# Patient Record
Sex: FEMALE | Race: ASIAN | NOT HISPANIC OR LATINO | Employment: FULL TIME | URBAN - METROPOLITAN AREA
[De-identification: names, ages, dates, MRNs, and addresses within clinical notes are randomized per-mention and may not be internally consistent; named-entity substitution may affect disease eponyms.]

---

## 2021-10-26 ENCOUNTER — APPOINTMENT (OUTPATIENT)
Dept: OPTOMETRY | Facility: CLINIC | Age: 51
End: 2021-10-26
Payer: COMMERCIAL

## 2021-10-26 ENCOUNTER — OFFICE VISIT (OUTPATIENT)
Dept: OPTOMETRY | Facility: CLINIC | Age: 51
End: 2021-10-26
Payer: COMMERCIAL

## 2021-10-26 DIAGNOSIS — Z01.01 ENCOUNTER FOR EXAMINATION OF EYES AND VISION WITH ABNORMAL FINDINGS: Primary | ICD-10-CM

## 2021-10-26 DIAGNOSIS — H52.222 REGULAR ASTIGMATISM OF LEFT EYE: ICD-10-CM

## 2021-10-26 DIAGNOSIS — H52.4 PRESBYOPIA: ICD-10-CM

## 2021-10-26 DIAGNOSIS — H11.002 PTERYGIUM OF EYE, LEFT: ICD-10-CM

## 2021-10-26 PROBLEM — R73.03 PREDIABETES: Status: ACTIVE | Noted: 2021-03-09

## 2021-10-26 PROBLEM — K56.609 SBO (SMALL BOWEL OBSTRUCTION) (H): Status: ACTIVE | Noted: 2021-09-09

## 2021-10-26 PROCEDURE — V2020 VISION SVCS FRAMES PURCHASES: HCPCS | Performed by: OPTOMETRIST

## 2021-10-26 PROCEDURE — V2200 LENS SPHER BIFOC PLANO 4.00D: HCPCS | Mod: LT | Performed by: OPTOMETRIST

## 2021-10-26 PROCEDURE — 92015 DETERMINE REFRACTIVE STATE: CPT | Performed by: OPTOMETRIST

## 2021-10-26 PROCEDURE — 92004 COMPRE OPH EXAM NEW PT 1/>: CPT | Performed by: OPTOMETRIST

## 2021-10-26 ASSESSMENT — REFRACTION_MANIFEST
OS_SPHERE: +0.25
OD_CYLINDER: SPHERE
OS_AXIS: 120
METHOD_AUTOREFRACTION: 1
OD_SPHERE: +0.25
OS_CYLINDER: +1.25
OS_ADD: +1.00
OD_CYLINDER: SPHERE
OS_AXIS: 119
OS_SPHERE: +0.50
OD_ADD: +1.00
OD_SPHERE: +0.25
OS_CYLINDER: +1.00

## 2021-10-26 ASSESSMENT — TONOMETRY
OD_IOP_MMHG: 17
IOP_METHOD: APPLANATION
OS_IOP_MMHG: 17

## 2021-10-26 ASSESSMENT — EXTERNAL EXAM - LEFT EYE: OS_EXAM: NORMAL

## 2021-10-26 ASSESSMENT — CONF VISUAL FIELD
OD_NORMAL: 1
METHOD: COUNTING FINGERS
OS_NORMAL: 1

## 2021-10-26 ASSESSMENT — CUP TO DISC RATIO
OS_RATIO: 0.25
OD_RATIO: 0.3

## 2021-10-26 ASSESSMENT — SLIT LAMP EXAM - LIDS
COMMENTS: NORMAL
COMMENTS: NORMAL

## 2021-10-26 ASSESSMENT — VISUAL ACUITY
OD_SC: 20/200
OD_SC+: +3
OS_SC: 20/120
OD_SC: 20/30
OS_SC: 20/40
METHOD: SNELLEN - LINEAR
OS_SC+: -1

## 2021-10-26 ASSESSMENT — EXTERNAL EXAM - RIGHT EYE: OD_EXAM: NORMAL

## 2021-10-26 NOTE — PATIENT INSTRUCTIONS
Bifocal glasses prescription provided today.   Allow 2 weeks to adapt to the new glasses.     Return in 1 year for a comprehensive eye exam, or sooner if needed.     The effects of the dilating drops last for 4- 6 hours.  You will be more sensitive to light and vision will be blurry up close.  Mydriatic sunglasses were given if needed.    Elliot Buitrago, OD  [facility] [place]  [address] [place] [address]. [place]  [place]  [id number]    [phone]

## 2021-10-26 NOTE — LETTER
10/26/2021         RE: Pooja Hurt  [address] [facility] [address]        Dear Colleague,    Thank you for referring your patient, Pooja Hurt, to the [facility]. Please see a copy of my visit note below.    Chief Complaint   Patient presents with     Annual Eye Exam      Accompanied by   Last Eye Exam: never had one   Dilated Previously: No, side effects of dilation explained today    What are you currently using to see?  readers       Distance Vision Acuity: Noticed gradual change in both eyes    Near Vision Acuity: Not satisfied     Eye Comfort: Occasional discharge  Do you use eye drops? : No  Occupation or Hobbies: [place] [employer]    [employer]        Medical, surgical and family histories reviewed and updated 10/26/2021.       OBJECTIVE: See Ophthalmology exam    ASSESSMENT:    ICD-10-CM    1. Encounter for examination of eyes and vision with abnormal findings  Z01.01    2. Pterygium of eye, left  H11.002    3. Regular astigmatism of left eye  H52.222    4. Presbyopia  H52.4       PLAN:     Patient Instructions   Bifocal glasses prescription provided today.   Allow 2 weeks to adapt to the new glasses.     Return in 1 year for a comprehensive eye exam, or sooner if needed.     The effects of the dilating drops last for 4- 6 hours.  You will be more sensitive to light and vision will be blurry up close.  Mydriatic sunglasses were given if needed.    Elliot Buitrago, ARACELI  [facility]  [address] [place] [address]. [place]  [address]    [phone]             Again, thank you for allowing me to participate in the care of your patient.        Sincerely,        Elliot Buitrago, OD

## 2021-10-26 NOTE — PROGRESS NOTES
Chief Complaint   Patient presents with     Annual Eye Exam      Accompanied by   Last Eye Exam: never had one   Dilated Previously: No, side effects of dilation explained today    What are you currently using to see?  readers       Distance Vision Acuity: Noticed gradual change in both eyes    Near Vision Acuity: Not satisfied     Eye Comfort: Occasional discharge  Do you use eye drops? : No  Occupation or Hobbies: [place] [employer]    [facility]        Medical, surgical and family histories reviewed and updated 10/26/2021.       OBJECTIVE: See Ophthalmology exam    ASSESSMENT:    ICD-10-CM    1. Encounter for examination of eyes and vision with abnormal findings  Z01.01    2. Pterygium of eye, left  H11.002    3. Regular astigmatism of left eye  H52.222    4. Presbyopia  H52.4       PLAN:     Patient Instructions   Bifocal glasses prescription provided today.   Allow 2 weeks to adapt to the new glasses.     Return in 1 year for a comprehensive eye exam, or sooner if needed.     The effects of the dilating drops last for 4- 6 hours.  You will be more sensitive to light and vision will be blurry up close.  Mydriatic sunglasses were given if needed.    Elliot Buitrago, OD  [facility]  [address] [place] [address]. [place]  [place]  [address]    [phone]

## 2021-11-17 ENCOUNTER — APPOINTMENT (OUTPATIENT)
Dept: OPTOMETRY | Facility: CLINIC | Age: 51
End: 2021-11-17
Payer: COMMERCIAL

## 2021-11-17 PROCEDURE — 92341 FIT SPECTACLES BIFOCAL: CPT | Performed by: OPTOMETRIST

## 2023-07-25 ENCOUNTER — OFFICE VISIT (OUTPATIENT)
Dept: OPTOMETRY | Facility: CLINIC | Age: 53
End: 2023-07-25
Payer: COMMERCIAL

## 2023-07-25 ENCOUNTER — APPOINTMENT (OUTPATIENT)
Dept: OPTOMETRY | Facility: CLINIC | Age: 53
End: 2023-07-25
Payer: COMMERCIAL

## 2023-07-25 DIAGNOSIS — H52.222 REGULAR ASTIGMATISM OF LEFT EYE: ICD-10-CM

## 2023-07-25 DIAGNOSIS — Z01.01 ENCOUNTER FOR EXAMINATION OF EYES AND VISION WITH ABNORMAL FINDINGS: Primary | ICD-10-CM

## 2023-07-25 DIAGNOSIS — H52.4 PRESBYOPIA: ICD-10-CM

## 2023-07-25 DIAGNOSIS — H11.002 PTERYGIUM OF EYE, LEFT: ICD-10-CM

## 2023-07-25 PROCEDURE — 92015 DETERMINE REFRACTIVE STATE: CPT | Performed by: OPTOMETRIST

## 2023-07-25 PROCEDURE — V2020 VISION SVCS FRAMES PURCHASES: HCPCS | Performed by: OPTOMETRIST

## 2023-07-25 PROCEDURE — 92014 COMPRE OPH EXAM EST PT 1/>: CPT | Performed by: OPTOMETRIST

## 2023-07-25 PROCEDURE — V2100 LENS SPHER SINGLE PLANO 4.00: HCPCS | Mod: RT | Performed by: OPTOMETRIST

## 2023-07-25 ASSESSMENT — REFRACTION_MANIFEST
OD_ADD: +1.75
OS_ADD: +1.75
OD_SPHERE: PLANO
OD_CYLINDER: SPHERE
OS_SPHERE: +0.50
OS_CYLINDER: +1.00
OS_AXIS: 120

## 2023-07-25 ASSESSMENT — CONF VISUAL FIELD
OD_INFERIOR_NASAL_RESTRICTION: 0
OS_NORMAL: 1
OD_NORMAL: 1
OD_SUPERIOR_NASAL_RESTRICTION: 0
OS_INFERIOR_NASAL_RESTRICTION: 0
OS_INFERIOR_TEMPORAL_RESTRICTION: 0
METHOD: COUNTING FINGERS
OD_INFERIOR_TEMPORAL_RESTRICTION: 0
OS_SUPERIOR_NASAL_RESTRICTION: 0
OD_SUPERIOR_TEMPORAL_RESTRICTION: 0
OS_SUPERIOR_TEMPORAL_RESTRICTION: 0

## 2023-07-25 ASSESSMENT — VISUAL ACUITY
OS_SC: 20/60-1
METHOD: SNELLEN - LINEAR
OS_SC: 20/50
OD_SC: 20/120
OD_SC: 20/30
OS_SC+: -1
OD_SC+: -2

## 2023-07-25 ASSESSMENT — SLIT LAMP EXAM - LIDS
COMMENTS: NORMAL
COMMENTS: NORMAL

## 2023-07-25 ASSESSMENT — EXTERNAL EXAM - LEFT EYE: OS_EXAM: NORMAL

## 2023-07-25 ASSESSMENT — TONOMETRY
OD_IOP_MMHG: 14
OS_IOP_MMHG: 13
IOP_METHOD: APPLANATION

## 2023-07-25 ASSESSMENT — CUP TO DISC RATIO
OS_RATIO: 0.25
OD_RATIO: 0.3

## 2023-07-25 ASSESSMENT — EXTERNAL EXAM - RIGHT EYE: OD_EXAM: NORMAL

## 2023-07-25 NOTE — PROGRESS NOTES
Chief Complaint   Patient presents with    Diabetic Eye Exam     Accompanied by Iphone  [id number],  , Riley and son, Oscar  Chief Complaint(s) and History of Present Illness(es)       Diabetic Eye Exam              Diabetes Type: PT states not diabetic but prediabetes dx in chart    Duration: years    Blood Sugars: Doesn't check                   No results found for: A1C      Last Eye Exam: 10/26/2021  Dilated Previously: Yes, side effects of dilation explained today    What are you currently using to see?  +2.50 readers - uses for fine print - did not bring with today     Distance Vision Acuity: Satisfied with vision    Near Vision Acuity: Not satisfied - relying more on cheaters    Eye Comfort: good  Do you use eye drops? 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       OBJECTIVE: See Ophthalmology exam    ASSESSMENT:    ICD-10-CM    1. Encounter for examination of eyes and vision with abnormal findings  Z01.01       2. Pterygium of eye, left  H11.002       3. Regular astigmatism of left eye  H52.222       4. Presbyopia  H52.4           PLAN:    Pooja Hurt aware  eye exam results will be sent to Clinic - [facility].  Patient Instructions   Reading glasses prescription provided today, per patient request.     Return in 1 year for a comprehensive eye exam, or sooner if needed.      The effects of the dilating drops last for 4- 6 hours.  You will be more sensitive to light and vision will be blurry up close.  Mydriatic sunglasses were given if needed.     Elliot Buitrago, [facility]  [address] [place] [address]. [place]  [id number]    [phone]

## 2023-07-25 NOTE — LETTER
7/25/2023         RE: Pooja Hurt  [address] [facility] [address]        Dear Colleague,    Thank you for referring your patient, Pooja Hurt, to the [facility]. Please see a copy of my visit note below.    Chief Complaint   Patient presents with     Diabetic Eye Exam     Accompanied by Iphone  [id number],  , Riley and son, Oscar  Chief Complaint(s) and History of Present Illness(es)       Diabetic Eye Exam              Diabetes Type: PT states not diabetic but prediabetes dx in chart    Duration: years    Blood Sugars: Doesn't check                   No results found for: A1C      Last Eye Exam: 10/26/2021  Dilated Previously: Yes, side effects of dilation explained today    What are you currently using to see?  +2.50 readers - uses for fine print - did not bring with today     Distance Vision Acuity: Satisfied with vision    Near Vision Acuity: Not satisfied - relying more on cheaters    Eye Comfort: good  Do you use eye drops? 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       OBJECTIVE: See Ophthalmology exam    ASSESSMENT:    ICD-10-CM    1. Encounter for examination of eyes and vision with abnormal findings  Z01.01       2. Pterygium of eye, left  H11.002       3. Regular astigmatism of left eye  H52.222       4. Presbyopia  H52.4           PLAN:    Pooja Hurt aware  eye exam results will be sent to Clinic - [facility].  Patient Instructions   Reading glasses prescription provided today, per patient request.     Return in 1 year for a comprehensive eye exam, or sooner if needed.      The effects of the dilating drops last for 4- 6 hours.  You will be more sensitive to light and vision will be blurry up close.  Mydriatic sunglasses were given if needed.     Elliot Buitrago, OD  [facility]  [place]  [address] [facility]  [place]  [address]    [phone]           Again, thank you for allowing me to participate in the care of your patient.        Sincerely,        Elliot Buitrago OD

## 2023-07-25 NOTE — PATIENT INSTRUCTIONS
Reading glasses prescription provided today, per patient request.     Return in 1 year for a comprehensive eye exam, or sooner if needed.      The effects of the dilating drops last for 4- 6 hours.  You will be more sensitive to light and vision will be blurry up close.  Mydriatic sunglasses were given if needed.     Elliot Buitrago, OD  [address]. [address]  [place]  [address]    [phone]

## 2023-08-15 ENCOUNTER — APPOINTMENT (OUTPATIENT)
Dept: OPTOMETRY | Facility: CLINIC | Age: 53
End: 2023-08-15
Payer: COMMERCIAL

## 2023-08-15 PROCEDURE — 92340 FIT SPECTACLES MONOFOCAL: CPT | Performed by: OPTOMETRIST
